# Patient Record
Sex: FEMALE | Race: WHITE | Employment: PART TIME | URBAN - METROPOLITAN AREA
[De-identification: names, ages, dates, MRNs, and addresses within clinical notes are randomized per-mention and may not be internally consistent; named-entity substitution may affect disease eponyms.]

---

## 2016-12-28 LAB
ANTIBODY SCREEN, EXTERNAL: NEGATIVE
CHLAMYDIA, EXTERNAL: NEGATIVE
HBSAG, EXTERNAL: NEGATIVE
HCT, EXTERNAL: 34.4
HGB, EXTERNAL: 11.7
HIV, EXTERNAL: NON REACTIVE
N. GONORRHEA, EXTERNAL: NEGATIVE
PLATELET CNT,   EXTERNAL: 259
RUBELLA, EXTERNAL: NORMAL
T. PALLIDUM, EXTERNAL: NEGATIVE
URINALYSIS, EXTERNAL: NO GROWTH

## 2017-03-28 ENCOUNTER — INITIAL PRENATAL (OUTPATIENT)
Dept: OBGYN CLINIC | Age: 32
End: 2017-03-28

## 2017-03-28 VITALS — DIASTOLIC BLOOD PRESSURE: 82 MMHG | BODY MASS INDEX: 45.42 KG/M2 | WEIGHT: 290 LBS | SYSTOLIC BLOOD PRESSURE: 118 MMHG

## 2017-03-28 DIAGNOSIS — O99.210 OBESITY IN PREGNANCY: ICD-10-CM

## 2017-03-28 DIAGNOSIS — Z23 ENCOUNTER FOR IMMUNIZATION: ICD-10-CM

## 2017-03-28 DIAGNOSIS — Z34.80 PRENATAL CARE OF MULTIGRAVIDA, ANTEPARTUM: Primary | ICD-10-CM

## 2017-03-28 NOTE — MR AVS SNAPSHOT
Visit Information Date & Time Provider Department Dept. Phone Encounter #  
 3/28/2017 10:10 AM Ian Luis MD [address] [id number] Upcoming Health Maintenance Date Due  
 PAP AKA CERVICAL CYTOLOGY 1/20/2015 INFLUENZA AGE 9 TO ADULT 8/1/2016 Allergies as of 3/28/2017  Review Complete On: 3/28/2017 By: Erich Multani LPN Severity Noted Reaction Type Reactions Pollen Extracts  06/08/2010    Unknown (comments) Sneezing Current Immunizations  Reviewed on 2/6/2015 Name Date Influenza Vaccine (Quad) PF 2/6/2015 [address] INJECTION 4/13/2012 Not reviewed this visit Vitals BP Weight(growth percentile) BMI OB Status Smoking Status 118/82 290 lb (131.5 kg) 45.42 kg/m2 Having regular periods Never Smoker BMI and BSA Data Body Mass Index Body Surface Area 45.42 kg/m 2 2.49 m 2 Your Updated Medication List  
  
   
This list is accurate as of: 3/28/17 10:41 AM.  Always use your most recent med list.  
  
  
  
  
 albuterol 90 mcg/actuation inhaler Commonly known as:  PROVENTIL HFA, VENTOLIN HFA, PROAIR HFA Take 1 Puff by inhalation every six (6) hours as needed for Wheezing or Shortness of Breath. citalopram 20 mg tablet Commonly known as:  [place]arrow Take 1 Tab by mouth daily. ibuprofen 600 mg tablet Commonly known as:  MOTRIN Take 1 Tab by mouth every six (6) hours as needed. lisinopril 10 mg tablet Commonly known as:  Meg Radha Take 1 Tab by mouth daily. PRENATAL VITAMIN PO Take  by mouth. Patient Instructions Weeks 18 to 22 of Your Pregnancy: Care Instructions Your Care Instructions Your baby is continuing to develop quickly. At this stage, babies can now suck their thumbs,  firmly with their hands, and open and close their eyelids. Sometime between 18 and 22 weeks, you will start to feel your baby move. At first, these small fetal movements feel like fluttering or \"butterflies. \" Some women say that they feel like gas bubbles. As the baby grows, these movements will become stronger. You may also notice that your baby kicks and hiccups. During this time, you may find that your nausea and fatigue are gone. Overall, you may feel better and have more energy than you did in your first trimester. But you may also have new discomforts now, such as sleep problems or leg cramps. This care sheet can help you ease these discomforts. Follow-up care is a key part of your treatment and safety. Be sure to make and go to all appointments, and call your doctor if you are having problems. It's also a good idea to know your test results and keep a list of the medicines you take. How can you care for yourself at home? Ease sleep problems · Avoid caffeine in drinks or chocolate late in the day. · Get some exercise every day. · Take a warm shower or bath before bed. · Have a light snack or glass of milk at bedtime. · Do relaxation exercises in bed to calm your mind and body. · Support your legs and back with extra pillows. Try a pillow between your legs if you sleep on your side. · Do not use sleeping pills or alcohol. They could harm your baby. Ease leg cramps · Do not massage your calf during the cramp. · Sit on a firm bed or chair. Straighten your leg, and bend your foot (flex your ankle) slowly upward, toward your knee. Bend your toes up and down. · Stand on a cool, flat surface. Stretch your toes upward, and take small steps walking on your heels. · Use a heating pad or hot water bottle to help with muscle ache. Prevent leg cramps · Be sure to get enough calcium. If you are worried that you are not getting enough, talk to your doctor. · Exercise every day, and stretch your legs before bed. · Take a warm bath before bed, and try leg warmers at night. Where can you learn more? Go to http://alirio-benedicto.info/. Enter O349 in the search box to learn more about \"Weeks 18 to 22 of Your Pregnancy: Care Instructions. \" Current as of: May 30, 2016 Content Version: 11.1 © 5319-1496 Healthwise, Incorporated. Care instructions adapted under license by [employer] (which disclaims liability or warranty for this information). If you have questions about a medical condition or this instruction, always ask your healthcare professional. [address] any warranty or liability for your use of this information. Introducing \[facility]\"" & HEALTH SERVICES! Dear Cheryl Pena: Thank you for requesting a ShareMeister account. Our records indicate that you already have an active ShareMeister account. You can access your account anytime at [email or web address] Did you know that you can access your hospital and ER discharge instructions at any time in ShareMeister? You can also review all of your test results from your hospital stay or ER visit. Additional Information If you have questions, please visit the Frequently Asked Questions section of the ShareMeister website at [email or web address] Remember, ShareMeister is NOT to be used for urgent needs. For medical emergencies, dial 911. Now available from your iPhone and Android! Please provide this summary of care documentation to your next provider. Your primary care clinician is listed as [address] [place] [address]. If you have any questions after today's visit, please call [phone].

## 2017-03-28 NOTE — PROGRESS NOTES
Patient received the Influenza vaccine in the right deltoid without complications, per MD order. Consent signed.

## 2017-03-28 NOTE — PATIENT INSTRUCTIONS

## 2017-03-28 NOTE — PROGRESS NOTES
[facility]  [email or web address]  [phone]    Luigi Guerra MD, [address] [facility]     Chief complaint:  Irregular cycles  Last cycle; No LMP recorded. Patient is pregnant. This is a new concern and an evaluation is planned. Current pregnancy history:  Kira Epstein is a , 27 y.o. female [facility]   She presents for the evaluation of irregular menses and a positive pregnancy test. Transfer from [facility]. LMP history:  No LMP recorded. Patient is pregnant. .  The date of the beginning of her last menstrual period is not certain. Her menses are regular. Her cycles occur about every 4 weeks. A urine pregnancy test was positive about she does not remember. She was not using contraception at the estimated time of conception. [facility]. Patient is 20w3d based on her [address] today. Ultrasound data:  Patient did not have an ultrasound done at our office. Records pending. Pregnancy symptoms:  She reports breast tenderness  nausea  frequent urination. She denies fatigue  \"morning sickness\"  positive home pregnancy test.  Since she found out she is pregnant, she has gained, 10 lbs. She reports her prepregnancy weight as 280 pounds. Relevant past pregnancy history:  She has the following pregnancy history:none. She does not have a history of  delivery. She does not have a history of a prior  section. Relevant past medical history:(relevant to this pregnancy):   none     Pap smear history:  Last pap smear: December per patient. Results: within normal limits per patient. Occupational history  Her occupation is: full time job working overnights at [employer] Substance history:   She does not report current tobacco use. She does not report current alcohol use. She does not report current drug use. Exposure history: There are indoor cat(s) in the home.   patient states that she does not clean the liter box, and the cat is mostly outdoors now. The patient was instructed not to change cat litter boxes during pregnancy. She does report close contact with children on a regular basis. She has chicken pox or the vaccine in the past.   Patient does not report issues with domestic violence. Genetic Screening/Teratology Counseling:   (Includes patient, baby's father, or anyone in either family with:)  3.  Patient's age >/= 28 at EDC?--32      FOB age: 34years old. 2.  Thalassemia ([facility], [facility], [facility], or  background): MCV<80?--yes and [place]  3. Neural tube defect (meningomyelocele, spina bifida, anencephaly)? --no  4. Congenital heart defect?--no  5. Down syndrome?--no  6. Lee-Sachs ([address] [place] [address])? --no  7. Canavan's Disease?--no  8. Familial Dysautonomia?--no   9. Sickle cell disease or trait ()? --no   Has she been tested for sickle trait: No  10. Hemophilia or other blood disorders?--no  11. Muscular dystrophy?--no  12. Cystic fibrosis? --no  13. [place]'s Chorea?--no  14. Mental retardation/autism (if yes was person tested for Fragile X)?-no, pt's son ? autism  15. Other inherited genetic or chromosomal disorder?- no  16. Maternal metabolic disorder (DM, PKU, etc)? --no  17. Patient or FOB with a child with a birth defect not listed above?--no  17a. Patient or FOB with a birth defect themselves?--no  25. Recurrent pregnancy loss, or stillbirth?--no  19. Any medications since LMP other than prenatal vitamins (include vitamins, supplements, OTC meds, drugs, alcohol)? -- PNV, tylenol  20. Any other genetic/environmental exposure to discuss?--no. Infection History:  1. Lives with someone with TB or TB exposed?--no  2. Patient or partner has history of genital herpes?--no  3. Rash or viral illness since LMP?--no  4. History of STD (GC, CT, HPV, syphilis, HIV)? --no  5.   Have you received a flu vaccine for the most recent flu season? -- no, declined  6. Have you or your sexual partner(s) travelled to a [place] area in the last 3 months? -- no    Past Medical History:   Diagnosis Date    Anxiety     Essential hypertension     Pregnancy induced- no meds    Hypertension     Obesity 2010    RH neg     A-    Rh negative status during pregnancy 2010    Rh negative status during pregnancy 2010     Past Surgical History:   Procedure Laterality Date    HYSTEROSCOPY,RMV FB N/A 2015    HYSTEROSCOPY WITH REMOVAL OF FOREIGN BODY MIRENA IUD performed by Rochelle Sy MD at [address] Not on file. Social History Main Topics    Smoking status: Never Smoker    Smokeless tobacco: Never Used    Alcohol use No    Drug use: No    Sexual activity: Yes     Partners: Male     Family History   Problem Relation Age of Onset    Heart Disease Father     Diabetes Father     Hypertension Father     Depression Father     Gout Father     Congenital heart defect Father     Hypertension Mother     Thyroid Disease Mother     Depression Mother     MS Maternal Grandmother     Depression Maternal Grandmother     Cancer Maternal Grandfather      Lung Cancer    Alcohol abuse Neg Hx     Arthritis-rheumatoid Neg Hx     Asthma Neg Hx     Bleeding Prob Neg Hx     Elevated Lipids Neg Hx     Headache Neg Hx     Migraines Neg Hx     Psychiatric Disorder Neg Hx     Stroke Neg Hx     Mental Retardation Neg Hx      OB History    Para Term  AB SAB TAB Ectopic Multiple Living   6 4 4  1     4      # Outcome Date GA Lbr Johnnie/2nd Weight Sex Delivery Anes PTL Lv   6 Current            5 Term 12 40w0d  9 lb 6.4 oz (4.265 kg) F Vag-Spont EPI  Y      Birth Comments: System Generated. Please review and update pregnancy details.    4 Term 08/24/10 39w2d  9 lb 3 oz (4.167 kg) F VAGINAL DELI None N Y   3 Term 09 41w0d 03:30 8 lb 3 oz (3.714 kg) U Vag-Spont EPI N Y   2 Term 11/11/06 38w3d 07:00 8 lb 6 oz (3.799 kg)  Vag-Spont EPI N Y   1 AB         N        Allergies   Allergen Reactions    Pollen Extracts Unknown (comments)     Sneezing     Prior to Admission medications    Medication Sig Start Date End Date Taking? Authorizing Provider   PRENATAL VIT CALC,IRON,FOLIC (PRENATAL VITAMIN PO) Take  by mouth.    Yes Historical Provider        Review of Systems - History obtained from the patient  Constitutional: negative for weight loss, fever, night sweats  HEENT: negative for hearing loss, earache, congestion, snoring, sorethroat  CV: negative for chest pain, palpitations, edema  Resp: negative for cough, shortness of breath, wheezing  GI: negative for change in bowel habits, abdominal pain, black or bloody stools  : negative for frequency, dysuria, hematuria, vaginal discharge  MSK: negative for back pain, joint pain, muscle pain  Breast: negative for breast lumps, nipple discharge, galactorrhea  Skin :negative for itching, rash, hives  Neuro: negative for dizziness, headache, confusion, weakness  Psych: negative for anxiety, depression, change in mood  Heme/lymph: negative for bleeding, bruising, pallor    Objective:  Visit Vitals    /82    Wt 290 lb (131.5 kg)    BMI 45.42 kg/m2       Physical Exam:   Constitutional  · Appearance: well-nourished, well developed, alert, in no acute distress    HENT  · Head  · Face: appears normal  · Eyes: appear normal  · Ears: normal  · Mouth: normal  · Lips: no lesions    Neck  · Inspection/Palpation: normal appearance, no masses or tenderness  · Lymph Nodes: no lymphadenopathy present  · Thyroid: gland size normal, nontender, no nodules or masses present on palpation    Chest  · Respiratory Effort: breathing unlabored  · Auscultation: normal breath sounds    Cardiovascular  · Heart:  · Auscultation: regular rate and rhythm without murmur    Breasts  · Inspection of Breasts: breasts symmetrical, no skin changes, no discharge present, nipple appearance normal, no skin retraction present  · Palpation of Breasts and Axillae: no masses present on palpation, no breast tenderness  · Axillary Lymph Nodes: no lymphadenopathy present    Gastrointestinal  · Abdominal Examination: abdomen non-tender to palpation, normal bowel sounds, no masses present  · Liver and spleen: no hepatomegaly present, spleen not palpable  · Hernias: no hernias identified    Genitourinary  · External Genitalia: normal appearance for age, no discharge present, no tenderness present, no inflammatory lesions present, no masses present, no atrophy present  · Vagina: normal vaginal vault without central or paravaginal defects, no discharge present, no inflammatory lesions present, no masses present  · Bladder: non-tender to palpation  · Urethra: appears normal  · Cervix: normal appearing with discharge or lesions, os closed  · Uterus: enlarged, normal shape, soft  · Adnexa: no adnexal tenderness present, no adnexal masses present  · Perineum: perineum within normal limits, no evidence of trauma, no rashes or skin lesions present  · Anus: anus within normal limits, no hemorrhoids present  · Inguinal Lymph Nodes: no lymphadenopathy present    Skin  · General Inspection: no rash, no lesions identified    Neurologic/Psychiatric  · Mental Status:  · Orientation: grossly oriented to person, place and time  · Mood and Affect: mood normal, affect appropriate    Assessment:   Irregular cycles  Encounter Diagnoses   Name Primary?  Obesity in pregnancy     Prenatal care of multigravida, antepartum Yes    Encounter for immunization      Due date: ? Outside [place] need records    Plan:   We discussed options of genetic screening and diagnostic testing including:  CF testing, CVS, amniocentesis first trimester screening/NT, MSAFP, and NIPT (handout given to patient for review and consent)  She is interested in prenatal genetic testing of her fetus.  She thinks she had with other practice, need records  Plan: 20 wk US  The course of pregnancy discussed including visit schedule, ultrasounds, lab testing, etc.  Pt advised to avoid alcoholic beverages and illicit/recreational drugs use  Recommend taking prenatal vitamins or folic acid daily with DHA/fish oil. The hospital and practice style discussed with coverage system. We discussed nutrition, toxoplasmosis precautions, sexual activity, exercise, need for influenza vaccine, environmental and work hazards, travel advice, screen for domestic violence, need for seat belts. We discussed seafood, unpasteurized dairy products, deli meat, artificial sweeteners, and caffeine intake. We recommend avoiding chemical and toxin exposures when possible. Information on prenatal and breastfeeding classes given. Information on circumcision given  Patient encouraged not to smoke. Discussed current prescription drug use. Given medication list.  Discussed the use of over the counter medications and chemicals. She is advised to contact her MD with any questions. Pt understands risk of hemorrhage during pregnancy and post delivery and would accept blood products if necessary in life-threatening emergencies  We discussed signs and symptoms of abnormal pregnancies and miscarriage. Handouts given to pt. Need records, dating, US, Hgb electrophoresis, (declined CF) , genetic screening   20 wk US asap  Flu vaccine  PIH precautions: ho ghtn? Orders Placed This Encounter    ID IMMUNIZ ADMIN,1 SINGLE/COMB VAC/TOXOID    INFLUENZA VIRUS VAC QUAD,SPLIT,PRESV FREE SYRINGE 3/> YRS IM    PRENATAL VIT CALC,IRON,FOLIC (PRENATAL VITAMIN PO)     Follow-up Disposition:  Return in about 1 week (around 4/4/2017) for 20 wk us FOB.

## 2017-04-04 ENCOUNTER — ROUTINE PRENATAL (OUTPATIENT)
Dept: OBGYN CLINIC | Age: 32
End: 2017-04-04

## 2017-04-04 VITALS
DIASTOLIC BLOOD PRESSURE: 78 MMHG | HEIGHT: 67 IN | WEIGHT: 287 LBS | SYSTOLIC BLOOD PRESSURE: 112 MMHG | BODY MASS INDEX: 45.04 KG/M2

## 2017-04-04 DIAGNOSIS — Z34.80 PRENATAL CARE OF MULTIGRAVIDA, ANTEPARTUM: Primary | ICD-10-CM

## 2017-04-04 NOTE — MR AVS SNAPSHOT
Visit Information Date & Time Provider Department Dept. Phone Encounter #  
 4/4/2017  1:50 PM MD Jules Carreno [id number] Your Appointments 5/2/2017 10:20 AM  
OB VISIT with [place], MD  
[place] ([address]) Appt Note: 4 wk fob w/ TP (25 wk) [address] [facility] [address] [phone]  
[facility] [address] Upcoming Health Maintenance Date Due  
 PAP AKA CERVICAL CYTOLOGY 12/31/2019 Allergies as of 4/4/2017  Review Complete On: 4/4/2017 By: Pérez Honeycutt LPN Severity Noted Reaction Type Reactions Pollen Extracts  06/08/2010    Unknown (comments) Sneezing Current Immunizations  Reviewed on 2/6/2015 Name Date Influenza Vaccine (Quad) PF 3/28/2017, 2/6/2015 [address] INJECTION 4/13/2012 Not reviewed this visit Vitals BP Height(growth percentile) Weight(growth percentile) LMP BMI OB Status 112/78 5' 7\" (1.702 m) 287 lb (130.2 kg) (LMP Unknown) 44.95 kg/m2 Pregnant Smoking Status Never Smoker BMI and BSA Data Body Mass Index Body Surface Area 44.95 kg/m 2 2.48 m 2 Your Updated Medication List  
  
   
This list is accurate as of: 4/4/17  2:01 PM.  Always use your most recent med list.  
  
  
  
  
 PRENATAL VITAMIN PO Take  by mouth. Patient Instructions Weeks 18 to 22 of Your Pregnancy: Care Instructions Your Care Instructions Your baby is continuing to develop quickly. At this stage, babies can now suck their thumbs,  firmly with their hands, and open and close their eyelids. Sometime between 18 and 22 weeks, you will start to feel your baby move. At first, these small fetal movements feel like fluttering or \"butterflies. \" Some women say that they feel like gas bubbles.  As the baby grows, these movements will become stronger. You may also notice that your baby kicks and hiccups. During this time, you may find that your nausea and fatigue are gone. Overall, you may feel better and have more energy than you did in your first trimester. But you may also have new discomforts now, such as sleep problems or leg cramps. This care sheet can help you ease these discomforts. Follow-up care is a key part of your treatment and safety. Be sure to make and go to all appointments, and call your doctor if you are having problems. It's also a good idea to know your test results and keep a list of the medicines you take. How can you care for yourself at home? Ease sleep problems · Avoid caffeine in drinks or chocolate late in the day. · Get some exercise every day. · Take a warm shower or bath before bed. · Have a light snack or glass of milk at bedtime. · Do relaxation exercises in bed to calm your mind and body. · Support your legs and back with extra pillows. Try a pillow between your legs if you sleep on your side. · Do not use sleeping pills or alcohol. They could harm your baby. Ease leg cramps · Do not massage your calf during the cramp. · Sit on a firm bed or chair. Straighten your leg, and bend your foot (flex your ankle) slowly upward, toward your knee. Bend your toes up and down. · Stand on a cool, flat surface. Stretch your toes upward, and take small steps walking on your heels. · Use a heating pad or hot water bottle to help with muscle ache. Prevent leg cramps · Be sure to get enough calcium. If you are worried that you are not getting enough, talk to your doctor. · Exercise every day, and stretch your legs before bed. · Take a warm bath before bed, and try leg warmers at night. Where can you learn more? Go to http://alirio-benedicto.info/. Enter M235 in the search box to learn more about \"Weeks 18 to 22 of Your Pregnancy: Care Instructions. \" 
 Current as of: May 30, 2016 Content Version: 11.2 © 1053-5342 [employer]. Care instructions adapted under license by Strikingly (which disclaims liability or warranty for this information). If you have questions about a medical condition or this instruction, always ask your healthcare professional. [address] any warranty or liability for your use of this information. Introducing [facility] & HEALTH SERVICES! Dear Alisha Berrios: Thank you for requesting a Krush account. Our records indicate that you already have an active Krush account. You can access your account anytime at [email or web address] Did you know that you can access your hospital and ER discharge instructions at any time in Krush? You can also review all of your test results from your hospital stay or ER visit. Additional Information If you have questions, please visit the Frequently Asked Questions section of the Krush website at [email or web address] Remember, Krush is NOT to be used for urgent needs. For medical emergencies, dial 911. Now available from your iPhone and Android! Please provide this summary of care documentation to your next provider. Your primary care clinician is listed as [address] [place] [address], . If you have any questions after today's visit, please call [phone].

## 2017-04-04 NOTE — PROGRESS NOTES
[facility]  [email or web address]  [address] MD [place], FACOG       [facility]   FOLLOW UP OB NOTE WITH ULTRASOUND    Chief Complaint   Patient presents with    Routine Prenatal Visit     Vitals:    04/04/17 1359   BP: 112/78   Weight: 287 lb (130.2 kg)   Height: 5' 7\" (1.702 m)       The patient reports the following concerns: Patient states that she is going to an oral surgeon to have 2 teeth extracted tomorrw, and would like to know what type of pain killers she will be able to use. Patient was given dental note. See PN flowsheet for exam    27 y.o. Mai Patterson  21w3d   Encounter Diagnosis   Name Primary?  Prenatal care of multigravida, antepartum Yes         I discussed with the patient the limitations of ultrasound and that imaging can not rule out all birth defects, chromosomal problems, or other problems with the baby. [] SAB/bleeding precautions reviewed   [] PTL/PPROM precautions reviewed   [] Labor precautions reviewed   [] Fetal kick counts discussed   [] Labs reviewed with patient   []   Dental note given, disc narcotics if indicated, use lowest dose/shortest period  May need to deliver at [facility]: number given, release signed  [facility] 4 wk: check anatomy/heart    I discussed with the patient the limitations of ultrasound and that imaging can not rule out all birth defects, chromosomal problems, or other problems with the baby. Follow-up Disposition: Not on File    No orders of the defined types were placed in this encounter. Kenan Carranza MD        Physician review of ultrasound performed by technician    Today's ultrasound report and images were reviewed and discussed with the patient. Please see images and imaging report entered by technician in PACS for more detail and progress note and diagnosis entered by MD.    Tung Paulino MD    FETAL SURVEY/BMI >40  A SINGLE VIABLE IUP AT 21W3D GA BY LMP. FETAL CARDIAC MOTION OBSERVED.   FETAL ANATOMY VISUALIZED AND APPEARS WITHIN NORMAL LIMITS. NO ABNORMALITIES IDENTIFIED ON TODAYS EXAM.  ANATOMY NOT SEEN, FETAL HEART. APPROPRIATE GROWTH MEASURED; SIZE = DATES. SANDI, CERVIX AND PLACENTA APPEAR WITHIN NORMAL LIMITS.   GENDER: XY

## 2017-04-04 NOTE — PATIENT INSTRUCTIONS
